# Patient Record
Sex: MALE | Race: WHITE | NOT HISPANIC OR LATINO | Employment: OTHER | URBAN - METROPOLITAN AREA
[De-identification: names, ages, dates, MRNs, and addresses within clinical notes are randomized per-mention and may not be internally consistent; named-entity substitution may affect disease eponyms.]

---

## 2023-08-23 PROBLEM — G43.909 MIGRAINE: Status: ACTIVE | Noted: 2023-08-23

## 2023-08-23 PROBLEM — E11.9 ABNORMAL METABOLIC STATE DUE TO DIABETES MELLITUS (MULTI): Status: ACTIVE | Noted: 2023-08-23

## 2023-08-23 PROBLEM — E78.00 HYPERCHOLESTEROLEMIA: Status: ACTIVE | Noted: 2023-08-23

## 2023-08-23 PROBLEM — G44.209 TENSION HEADACHE: Status: ACTIVE | Noted: 2023-08-23

## 2023-08-23 PROBLEM — I49.3 VENTRICULAR PREMATURE DEPOLARIZATION: Status: ACTIVE | Noted: 2023-08-23

## 2023-08-23 PROBLEM — E11.9 TYPE 2 DIABETES MELLITUS WITHOUT COMPLICATION (MULTI): Status: ACTIVE | Noted: 2023-08-23

## 2023-08-23 PROBLEM — F17.210 CIGARETTE SMOKER: Status: ACTIVE | Noted: 2023-08-23

## 2023-08-23 PROBLEM — M10.9 GOUT: Status: ACTIVE | Noted: 2023-08-23

## 2023-08-23 PROBLEM — J32.9 SINUSITIS: Status: ACTIVE | Noted: 2023-08-23

## 2023-08-23 PROBLEM — I71.40 ABDOMINAL AORTIC ANEURYSM (CMS-HCC): Status: ACTIVE | Noted: 2023-08-23

## 2023-08-23 PROBLEM — D64.9 ANEMIA: Status: ACTIVE | Noted: 2023-08-23

## 2023-08-23 PROBLEM — R00.2 PALPITATIONS: Status: ACTIVE | Noted: 2023-08-23

## 2023-08-23 PROBLEM — H25.10 NUCLEAR SENILE CATARACT: Status: ACTIVE | Noted: 2023-08-23

## 2023-08-23 PROBLEM — K21.9 GASTROESOPHAGEAL REFLUX DISEASE: Status: ACTIVE | Noted: 2023-08-23

## 2023-08-23 PROBLEM — I10 ESSENTIAL HYPERTENSION: Status: ACTIVE | Noted: 2023-08-23

## 2023-08-23 PROBLEM — M16.9 OSTEOARTHRITIS OF HIP: Status: ACTIVE | Noted: 2023-08-23

## 2023-08-23 PROBLEM — R91.8 MULTIPLE NODULES OF LUNG: Status: ACTIVE | Noted: 2023-08-23

## 2023-08-23 PROBLEM — L02.31 ABSCESS, GLUTEAL CLEFT: Status: ACTIVE | Noted: 2023-08-23

## 2023-08-23 PROBLEM — J90 PLEURAL EFFUSION: Status: ACTIVE | Noted: 2023-08-23

## 2023-08-23 PROBLEM — J18.9 PNEUMONIA: Status: ACTIVE | Noted: 2023-08-23

## 2023-08-23 PROBLEM — G44.009 CLUSTER HEADACHE SYNDROME: Status: ACTIVE | Noted: 2023-08-23

## 2023-08-23 PROBLEM — I73.9 PERIPHERAL ARTERIAL DISEASE (CMS-HCC): Status: ACTIVE | Noted: 2023-08-23

## 2023-08-23 PROBLEM — E11.65 HYPERGLYCEMIA DUE TO TYPE 2 DIABETES MELLITUS (MULTI): Status: ACTIVE | Noted: 2023-08-23

## 2023-08-23 PROBLEM — R06.02 SHORTNESS OF BREATH: Status: ACTIVE | Noted: 2023-08-23

## 2023-08-23 PROBLEM — H60.90 OTITIS EXTERNA: Status: ACTIVE | Noted: 2023-08-23

## 2023-08-23 PROBLEM — N18.30 STAGE 3 CHRONIC KIDNEY DISEASE (MULTI): Status: ACTIVE | Noted: 2023-08-23

## 2023-08-23 RX ORDER — ALLOPURINOL 300 MG/1
1 TABLET ORAL DAILY
COMMUNITY
End: 2024-05-02 | Stop reason: WASHOUT

## 2023-08-23 RX ORDER — GABAPENTIN 100 MG/1
2 CAPSULE ORAL NIGHTLY
COMMUNITY
Start: 2018-07-31 | End: 2024-05-02 | Stop reason: ALTCHOICE

## 2023-08-23 RX ORDER — ALLOPURINOL 100 MG/1
1 TABLET ORAL DAILY
COMMUNITY
End: 2024-05-02 | Stop reason: SDUPTHER

## 2023-08-23 RX ORDER — PREDNISONE 20 MG/1
2 TABLET ORAL DAILY
COMMUNITY
Start: 2018-07-27 | End: 2024-05-02 | Stop reason: WASHOUT

## 2023-08-23 RX ORDER — INDOMETHACIN 50 MG/1
1 CAPSULE ORAL
COMMUNITY
Start: 2017-02-23 | End: 2024-05-02 | Stop reason: ALTCHOICE

## 2023-08-23 RX ORDER — IBUPROFEN 800 MG/1
1 TABLET ORAL 3 TIMES DAILY PRN
COMMUNITY
Start: 2018-03-20 | End: 2024-05-02 | Stop reason: ALTCHOICE

## 2023-08-23 RX ORDER — HYDROCHLOROTHIAZIDE 25 MG/1
1 TABLET ORAL DAILY
COMMUNITY
Start: 2019-01-23 | End: 2024-05-02 | Stop reason: SDUPTHER

## 2023-08-23 RX ORDER — SIMVASTATIN 40 MG/1
1 TABLET, FILM COATED ORAL EVERY EVENING
COMMUNITY
End: 2024-05-02 | Stop reason: WASHOUT

## 2023-08-23 RX ORDER — GABAPENTIN 300 MG/1
1 CAPSULE ORAL DAILY
COMMUNITY
End: 2024-05-02 | Stop reason: ALTCHOICE

## 2023-08-23 RX ORDER — SUMATRIPTAN 50 MG/1
1 TABLET, FILM COATED ORAL ONCE AS NEEDED
COMMUNITY
Start: 2017-07-07 | End: 2024-05-02 | Stop reason: WASHOUT

## 2023-08-23 RX ORDER — METOPROLOL SUCCINATE 25 MG/1
1 TABLET, EXTENDED RELEASE ORAL DAILY
COMMUNITY
Start: 2018-09-26 | End: 2024-05-02 | Stop reason: ALTCHOICE

## 2023-08-23 RX ORDER — VERAPAMIL HYDROCHLORIDE 240 MG/1
1 TABLET, FILM COATED, EXTENDED RELEASE ORAL DAILY
COMMUNITY
End: 2024-05-02 | Stop reason: SDUPTHER

## 2023-08-23 RX ORDER — OMEPRAZOLE 20 MG/1
1 CAPSULE, DELAYED RELEASE ORAL
COMMUNITY
End: 2024-05-02 | Stop reason: SDUPTHER

## 2023-08-23 RX ORDER — METFORMIN HYDROCHLORIDE 500 MG/1
2 TABLET, EXTENDED RELEASE ORAL DAILY
COMMUNITY
Start: 2021-01-07 | End: 2024-05-02 | Stop reason: SDUPTHER

## 2023-08-23 RX ORDER — LISINOPRIL 10 MG/1
1 TABLET ORAL DAILY
COMMUNITY
End: 2024-05-02 | Stop reason: WASHOUT

## 2023-08-23 RX ORDER — ALLOPURINOL 200 MG/1
1 TABLET ORAL DAILY
COMMUNITY
End: 2024-05-02 | Stop reason: WASHOUT

## 2023-08-23 RX ORDER — VERAPAMIL HYDROCHLORIDE 180 MG/1
1 TABLET, FILM COATED, EXTENDED RELEASE ORAL DAILY
COMMUNITY
Start: 2018-03-19 | End: 2024-05-02 | Stop reason: WASHOUT

## 2023-08-23 RX ORDER — LOSARTAN POTASSIUM 50 MG/1
1 TABLET ORAL DAILY
COMMUNITY
End: 2024-05-02 | Stop reason: SDUPTHER

## 2023-08-23 RX ORDER — AMOXICILLIN AND CLAVULANATE POTASSIUM 875; 125 MG/1; MG/1
1 TABLET, FILM COATED ORAL 2 TIMES DAILY
COMMUNITY
Start: 2018-03-20 | End: 2024-05-02 | Stop reason: ALTCHOICE

## 2023-08-23 RX ORDER — ATORVASTATIN CALCIUM 40 MG/1
1 TABLET, FILM COATED ORAL DAILY
COMMUNITY
End: 2024-05-02 | Stop reason: SDUPTHER

## 2023-08-23 RX ORDER — SUMATRIPTAN SUCCINATE 100 MG/1
1 TABLET ORAL
COMMUNITY
Start: 2017-08-16 | End: 2024-05-02 | Stop reason: WASHOUT

## 2023-08-23 RX ORDER — CYCLOBENZAPRINE HCL 10 MG
1 TABLET ORAL 3 TIMES DAILY PRN
COMMUNITY
Start: 2018-07-27 | End: 2024-05-02 | Stop reason: WASHOUT

## 2023-09-28 ENCOUNTER — HOSPITAL ENCOUNTER (OUTPATIENT)
Dept: DATA CONVERSION | Facility: HOSPITAL | Age: 68
Discharge: HOME | End: 2023-09-28
Payer: COMMERCIAL

## 2023-09-28 DIAGNOSIS — E11.9 TYPE 2 DIABETES MELLITUS WITHOUT COMPLICATIONS (MULTI): ICD-10-CM

## 2023-09-28 DIAGNOSIS — M10.9 GOUT, UNSPECIFIED: ICD-10-CM

## 2023-09-28 LAB
ALBUMIN SERPL-MCNC: 4.2 GM/DL (ref 3.5–5)
ALBUMIN/GLOB SERPL: 1.4 RATIO (ref 1.5–3)
ALP BLD-CCNC: 86 U/L (ref 35–125)
ALT SERPL-CCNC: 25 U/L (ref 5–40)
ANION GAP SERPL CALCULATED.3IONS-SCNC: 14 MMOL/L (ref 0–19)
APPEARANCE PLAS: ABNORMAL
AST SERPL-CCNC: 23 U/L (ref 5–40)
BILIRUB SERPL-MCNC: 0.5 MG/DL (ref 0.1–1.2)
BUN SERPL-MCNC: 22 MG/DL (ref 8–25)
BUN/CREAT SERPL: 16.9 RATIO (ref 8–21)
CALCIUM SERPL-MCNC: 9.1 MG/DL (ref 8.5–10.4)
CHLORIDE SERPL-SCNC: 106 MMOL/L (ref 97–107)
CHOLEST SERPL-MCNC: 121 MG/DL (ref 133–200)
CHOLEST/HDLC SERPL: 3.1 RATIO
CO2 SERPL-SCNC: 23 MMOL/L (ref 24–31)
COLOR SPUN FLD: ABNORMAL
CREAT SERPL-MCNC: 1.3 MG/DL (ref 0.4–1.6)
DEPRECATED RDW RBC AUTO: 47.6 FL (ref 37–54)
ERYTHROCYTE [DISTWIDTH] IN BLOOD BY AUTOMATED COUNT: 14.4 % (ref 11.7–15)
FASTING STATUS PATIENT QL REPORTED: ABNORMAL
GFR SERPL CREATININE-BSD FRML MDRD: 60 ML/MIN/1.73 M2
GLOBULIN SER-MCNC: 3.1 G/DL (ref 1.9–3.7)
GLUCOSE SERPL-MCNC: 80 MG/DL (ref 65–99)
HBA1C MFR BLD: 5.9 % (ref 4–6)
HCT VFR BLD AUTO: 47.5 % (ref 41–50)
HDLC SERPL-MCNC: 39 MG/DL
HGB BLD-MCNC: 15 GM/DL (ref 13.5–16.5)
LDLC SERPL CALC-MCNC: 50 MG/DL (ref 65–130)
MCH RBC QN AUTO: 28.5 PG (ref 26–34)
MCHC RBC AUTO-ENTMCNC: 31.6 % (ref 31–37)
MCV RBC AUTO: 90.1 FL (ref 80–100)
NRBC BLD-RTO: 0 /100 WBC
PLATELET # BLD AUTO: 119 K/UL (ref 150–450)
PMV BLD AUTO: 13 CU (ref 7–12.6)
POTASSIUM SERPL-SCNC: 3.8 MMOL/L (ref 3.4–5.1)
PROT SERPL-MCNC: 7.3 G/DL (ref 5.9–7.9)
RBC # BLD AUTO: 5.27 M/UL (ref 4.5–5.5)
SODIUM SERPL-SCNC: 143 MMOL/L (ref 133–145)
TRIGL SERPL-MCNC: 162 MG/DL (ref 40–150)
URATE SERPL-MCNC: 5.4 MG/DL (ref 3.6–7.7)
WBC # BLD AUTO: 8.3 K/UL (ref 4.5–11)

## 2023-10-11 PROBLEM — F41.9 ANXIETY: Status: ACTIVE | Noted: 2023-10-11

## 2024-05-02 ENCOUNTER — OFFICE VISIT (OUTPATIENT)
Dept: PRIMARY CARE | Facility: CLINIC | Age: 69
End: 2024-05-02
Payer: MEDICARE

## 2024-05-02 ENCOUNTER — LAB (OUTPATIENT)
Dept: LAB | Facility: LAB | Age: 69
End: 2024-05-02
Payer: MEDICARE

## 2024-05-02 VITALS
DIASTOLIC BLOOD PRESSURE: 73 MMHG | OXYGEN SATURATION: 96 % | WEIGHT: 200 LBS | SYSTOLIC BLOOD PRESSURE: 134 MMHG | HEART RATE: 68 BPM | BODY MASS INDEX: 28 KG/M2 | HEIGHT: 71 IN | TEMPERATURE: 97.7 F

## 2024-05-02 DIAGNOSIS — E78.00 HYPERCHOLESTEROLEMIA: ICD-10-CM

## 2024-05-02 DIAGNOSIS — I73.9 PERIPHERAL ARTERIAL DISEASE (CMS-HCC): ICD-10-CM

## 2024-05-02 DIAGNOSIS — I12.9 BENIGN HYPERTENSIVE KIDNEY DISEASE WITH CHRONIC KIDNEY DISEASE STAGE I THROUGH STAGE IV, OR UNSPECIFIED(403.10): ICD-10-CM

## 2024-05-02 DIAGNOSIS — F41.9 ANXIETY: ICD-10-CM

## 2024-05-02 DIAGNOSIS — Z12.5 SCREENING FOR PROSTATE CANCER: ICD-10-CM

## 2024-05-02 DIAGNOSIS — E11.9 TYPE 2 DIABETES MELLITUS WITHOUT COMPLICATION, WITHOUT LONG-TERM CURRENT USE OF INSULIN (MULTI): ICD-10-CM

## 2024-05-02 DIAGNOSIS — M1A.9XX0 CHRONIC GOUT WITHOUT TOPHUS, UNSPECIFIED CAUSE, UNSPECIFIED SITE: ICD-10-CM

## 2024-05-02 DIAGNOSIS — Z12.11 SCREENING FOR COLON CANCER: ICD-10-CM

## 2024-05-02 DIAGNOSIS — Z12.11 COLON CANCER SCREENING: ICD-10-CM

## 2024-05-02 DIAGNOSIS — E11.9 ABNORMAL METABOLIC STATE DUE TO DIABETES MELLITUS (MULTI): ICD-10-CM

## 2024-05-02 DIAGNOSIS — E11.9 TYPE 2 DIABETES MELLITUS WITHOUT COMPLICATION, WITHOUT LONG-TERM CURRENT USE OF INSULIN (MULTI): Primary | ICD-10-CM

## 2024-05-02 DIAGNOSIS — K21.9 GASTROESOPHAGEAL REFLUX DISEASE WITHOUT ESOPHAGITIS: ICD-10-CM

## 2024-05-02 DIAGNOSIS — N18.31 STAGE 3A CHRONIC KIDNEY DISEASE (MULTI): ICD-10-CM

## 2024-05-02 PROBLEM — I71.40 ABDOMINAL AORTIC ANEURYSM (CMS-HCC): Status: RESOLVED | Noted: 2023-08-23 | Resolved: 2024-05-02

## 2024-05-02 LAB
ALBUMIN SERPL-MCNC: 4.3 G/DL (ref 3.5–5)
ALP BLD-CCNC: 80 U/L (ref 35–125)
ALT SERPL-CCNC: <5 U/L (ref 5–40)
ANION GAP SERPL CALC-SCNC: 13 MMOL/L
AST SERPL-CCNC: 14 U/L (ref 5–40)
BASOPHILS # BLD AUTO: 0.07 X10*3/UL (ref 0–0.1)
BASOPHILS NFR BLD AUTO: 0.9 %
BILIRUB SERPL-MCNC: 0.3 MG/DL (ref 0.1–1.2)
BUN SERPL-MCNC: 27 MG/DL (ref 8–25)
CALCIUM SERPL-MCNC: 8.9 MG/DL (ref 8.5–10.4)
CHLORIDE SERPL-SCNC: 103 MMOL/L (ref 97–107)
CHOLEST SERPL-MCNC: 193 MG/DL (ref 133–200)
CHOLEST/HDLC SERPL: 6.2 {RATIO}
CO2 SERPL-SCNC: 25 MMOL/L (ref 24–31)
CREAT SERPL-MCNC: 1.3 MG/DL (ref 0.4–1.6)
CREAT UR-MCNC: 132.9 MG/DL
EGFRCR SERPLBLD CKD-EPI 2021: 60 ML/MIN/1.73M*2
EOSINOPHIL # BLD AUTO: 0.33 X10*3/UL (ref 0–0.7)
EOSINOPHIL NFR BLD AUTO: 4.3 %
ERYTHROCYTE [DISTWIDTH] IN BLOOD BY AUTOMATED COUNT: 14.3 % (ref 11.5–14.5)
GLUCOSE SERPL-MCNC: 143 MG/DL (ref 65–99)
HCT VFR BLD AUTO: 45.6 % (ref 41–52)
HDLC SERPL-MCNC: 31 MG/DL
HGB BLD-MCNC: 14.4 G/DL (ref 13.5–17.5)
IMM GRANULOCYTES # BLD AUTO: 0.03 X10*3/UL (ref 0–0.7)
IMM GRANULOCYTES NFR BLD AUTO: 0.4 % (ref 0–0.9)
LDLC SERPL CALC-MCNC: ABNORMAL MG/DL
LYMPHOCYTES # BLD AUTO: 1.56 X10*3/UL (ref 1.2–4.8)
LYMPHOCYTES NFR BLD AUTO: 20.2 %
MCH RBC QN AUTO: 28.7 PG (ref 26–34)
MCHC RBC AUTO-ENTMCNC: 31.6 G/DL (ref 32–36)
MCV RBC AUTO: 91 FL (ref 80–100)
MICROALBUMIN UR-MCNC: 18 MG/L (ref 0–23)
MICROALBUMIN/CREAT UR: 13.5 UG/MG CREAT
MONOCYTES # BLD AUTO: 0.6 X10*3/UL (ref 0.1–1)
MONOCYTES NFR BLD AUTO: 7.8 %
NEUTROPHILS # BLD AUTO: 5.14 X10*3/UL (ref 1.2–7.7)
NEUTROPHILS NFR BLD AUTO: 66.4 %
NRBC BLD-RTO: 0 /100 WBCS (ref 0–0)
PLATELET # BLD AUTO: 126 X10*3/UL (ref 150–450)
POC HEMOGLOBIN A1C: 6.3 % (ref 4.2–6.5)
POTASSIUM SERPL-SCNC: 4.2 MMOL/L (ref 3.4–5.1)
PROT SERPL-MCNC: 6.9 G/DL (ref 5.9–7.9)
PSA SERPL-MCNC: 0.7 NG/ML
RBC # BLD AUTO: 5.02 X10*6/UL (ref 4.5–5.9)
SODIUM SERPL-SCNC: 141 MMOL/L (ref 133–145)
TRIGL SERPL-MCNC: 529 MG/DL (ref 40–150)
VIT B12 SERPL-MCNC: 534 PG/ML (ref 211–946)
WBC # BLD AUTO: 7.7 X10*3/UL (ref 4.4–11.3)

## 2024-05-02 PROCEDURE — G2211 COMPLEX E/M VISIT ADD ON: HCPCS | Performed by: INTERNAL MEDICINE

## 2024-05-02 PROCEDURE — 99214 OFFICE O/P EST MOD 30 MIN: CPT | Performed by: INTERNAL MEDICINE

## 2024-05-02 PROCEDURE — 1157F ADVNC CARE PLAN IN RCRD: CPT | Performed by: INTERNAL MEDICINE

## 2024-05-02 PROCEDURE — 3078F DIAST BP <80 MM HG: CPT | Performed by: INTERNAL MEDICINE

## 2024-05-02 PROCEDURE — 1126F AMNT PAIN NOTED NONE PRSNT: CPT | Performed by: INTERNAL MEDICINE

## 2024-05-02 PROCEDURE — 80053 COMPREHEN METABOLIC PANEL: CPT

## 2024-05-02 PROCEDURE — 82607 VITAMIN B-12: CPT

## 2024-05-02 PROCEDURE — 83036 HEMOGLOBIN GLYCOSYLATED A1C: CPT | Performed by: INTERNAL MEDICINE

## 2024-05-02 PROCEDURE — 82043 UR ALBUMIN QUANTITATIVE: CPT

## 2024-05-02 PROCEDURE — 1159F MED LIST DOCD IN RCRD: CPT | Performed by: INTERNAL MEDICINE

## 2024-05-02 PROCEDURE — G0103 PSA SCREENING: HCPCS

## 2024-05-02 PROCEDURE — 80061 LIPID PANEL: CPT

## 2024-05-02 PROCEDURE — 85025 COMPLETE CBC W/AUTO DIFF WBC: CPT

## 2024-05-02 PROCEDURE — 82570 ASSAY OF URINE CREATININE: CPT

## 2024-05-02 PROCEDURE — 36415 COLL VENOUS BLD VENIPUNCTURE: CPT

## 2024-05-02 PROCEDURE — 4010F ACE/ARB THERAPY RXD/TAKEN: CPT | Performed by: INTERNAL MEDICINE

## 2024-05-02 PROCEDURE — 3075F SYST BP GE 130 - 139MM HG: CPT | Performed by: INTERNAL MEDICINE

## 2024-05-02 RX ORDER — ATORVASTATIN CALCIUM 40 MG/1
40 TABLET, FILM COATED ORAL DAILY
Qty: 90 TABLET | Refills: 2 | Status: SHIPPED | OUTPATIENT
Start: 2024-05-02

## 2024-05-02 RX ORDER — OMEPRAZOLE 20 MG/1
20 CAPSULE, DELAYED RELEASE ORAL
Qty: 90 CAPSULE | Refills: 2 | Status: SHIPPED | OUTPATIENT
Start: 2024-05-02

## 2024-05-02 RX ORDER — HYDROCHLOROTHIAZIDE 25 MG/1
25 TABLET ORAL DAILY
Qty: 90 TABLET | Refills: 2 | Status: SHIPPED | OUTPATIENT
Start: 2024-05-02

## 2024-05-02 RX ORDER — ALLOPURINOL 100 MG/1
100 TABLET ORAL DAILY
Qty: 90 TABLET | Refills: 2 | Status: SHIPPED | OUTPATIENT
Start: 2024-05-02

## 2024-05-02 RX ORDER — VERAPAMIL HYDROCHLORIDE 240 MG/1
240 TABLET, FILM COATED, EXTENDED RELEASE ORAL DAILY
Qty: 90 TABLET | Refills: 2 | Status: SHIPPED | OUTPATIENT
Start: 2024-05-02

## 2024-05-02 RX ORDER — METFORMIN HYDROCHLORIDE 500 MG/1
1000 TABLET, EXTENDED RELEASE ORAL DAILY
Qty: 180 TABLET | Refills: 2 | Status: SHIPPED | OUTPATIENT
Start: 2024-05-02

## 2024-05-02 RX ORDER — LOSARTAN POTASSIUM 50 MG/1
50 TABLET ORAL DAILY
Qty: 90 TABLET | Refills: 2 | Status: SHIPPED | OUTPATIENT
Start: 2024-05-02

## 2024-05-02 ASSESSMENT — ENCOUNTER SYMPTOMS
PALPITATIONS: 0
SHORTNESS OF BREATH: 0

## 2024-05-02 ASSESSMENT — PAIN SCALES - GENERAL: PAINLEVEL: 0-NO PAIN

## 2024-05-02 ASSESSMENT — PATIENT HEALTH QUESTIONNAIRE - PHQ9
1. LITTLE INTEREST OR PLEASURE IN DOING THINGS: NOT AT ALL
2. FEELING DOWN, DEPRESSED OR HOPELESS: NOT AT ALL
SUM OF ALL RESPONSES TO PHQ9 QUESTIONS 1 AND 2: 0

## 2024-05-02 NOTE — PATIENT INSTRUCTIONS
follow up October.    Diagnoses and all orders for this visit:  Abnormal metabolic state due to diabetes mellitus (Multi)  Hypercholesterolemia  -     Lipid Panel; Future  -     atorvastatin (Lipitor) 40 mg tablet; Take 1 tablet (40 mg) by mouth once daily.  Peripheral arterial disease (CMS-HCC)  Type 2 diabetes mellitus without complication, without long-term current use of insulin (Multi)  Comments:  HGA1C 6.3%, eye exam yearly.  Orders:  -     CBC and Auto Differential; Future  -     Comprehensive Metabolic Panel; Future  -     Vitamin B12; Future  -     metFORMIN  mg 24 hr tablet; Take 2 tablets (1,000 mg) by mouth once daily. With a meal as directed  -     empagliflozin (Jardiance) 25 mg; Take 1 tablet (25 mg) by mouth once daily in the morning.  -     Albumin , Urine Random; Future  Anxiety  Chronic gout without tophus, unspecified cause, unspecified site  -     allopurinol (Zyloprim) 100 mg tablet; Take 1 tablet (100 mg) by mouth once daily.  Screening for prostate cancer  -     PSA; Future  Colon cancer screening  Comments:  FIT home colon cancer screen.  Gastroesophageal reflux disease without esophagitis  -     omeprazole (PriLOSEC) 20 mg DR capsule; Take 1 capsule (20 mg) by mouth once daily in the morning. Take before meals. 30 mins before meal  Stage 3a chronic kidney disease (Multi)  Benign hypertensive kidney disease with chronic kidney disease stage I through stage IV, or unspecified(403.10)  Comments:  BP doing OK.  Orders:  -     verapamil SR (Calan-SR) 240 mg ER tablet; Take 1 tablet (240 mg) by mouth once daily.  -     metFORMIN  mg 24 hr tablet; Take 2 tablets (1,000 mg) by mouth once daily. With a meal as directed  -     losartan (Cozaar) 50 mg tablet; Take 1 tablet (50 mg) by mouth once daily.  -     hydroCHLOROthiazide (HYDRODiuril) 25 mg tablet; Take 1 tablet (25 mg) by mouth once daily.  Screening for colon cancer  -     Fecal Occult Blood Immunoassy; Future

## 2024-05-02 NOTE — PROGRESS NOTES
[facility]: [facility]  PROGRESS NOTE      Darrion Pruett is a 68 y.o. male that is presenting today for Follow-up (Follow up).    Assessment/Plan   Diagnoses and all orders for this visit:  Abnormal metabolic state due to diabetes mellitus (Multi)  Hypercholesterolemia  -     Lipid Panel; Future  -     atorvastatin (Lipitor) 40 mg tablet; Take 1 tablet (40 mg) by mouth once daily.  Peripheral arterial disease (CMS-HCC)  Type 2 diabetes mellitus without complication, without long-term current use of insulin (Multi)  Comments:  HGA1C 6.3%, eye exam yearly.  Orders:  -     CBC and Auto Differential; Future  -     Comprehensive Metabolic Panel; Future  -     Vitamin B12; Future  -     metFORMIN  mg 24 hr tablet; Take 2 tablets (1,000 mg) by mouth once daily. With a meal as directed  -     empagliflozin (Jardiance) 25 mg; Take 1 tablet (25 mg) by mouth once daily in the morning.  -     Albumin , Urine Random; Future  Anxiety  Chronic gout without tophus, unspecified cause, unspecified site  -     allopurinol (Zyloprim) 100 mg tablet; Take 1 tablet (100 mg) by mouth once daily.  Screening for prostate cancer  -     PSA; Future  Colon cancer screening  Comments:  FIT home colon cancer screen.  Gastroesophageal reflux disease without esophagitis  -     omeprazole (PriLOSEC) 20 mg DR capsule; Take 1 capsule (20 mg) by mouth once daily in the morning. Take before meals. 30 mins before meal  Stage 3a chronic kidney disease (Multi)  Benign hypertensive kidney disease with chronic kidney disease stage I through stage IV, or unspecified(403.10)  Comments:  BP doing OK.  Orders:  -     verapamil SR (Calan-SR) 240 mg ER tablet; Take 1 tablet (240 mg) by mouth once daily.  -     metFORMIN  mg 24 hr tablet; Take 2 tablets (1,000 mg) by mouth once daily. With a meal as directed  -     losartan (Cozaar) 50 mg tablet; Take 1 tablet (50 mg) by mouth once daily.  -     hydroCHLOROthiazide (HYDRODiuril)  25 mg tablet; Take 1 tablet (25 mg) by mouth once daily.  Screening for colon cancer  -     Fecal Occult Blood Immunoassy; Future    Subjective   Follow up DM, HTN, Chol, S/P AAA. Doing OK,  Tobacco dependence, declines CT chest aware inc. M/M risk.    Review of Systems   Respiratory:  Negative for shortness of breath.    Cardiovascular:  Negative for chest pain and palpitations.   All other systems reviewed and are negative.     Objective   Vitals:    05/02/24 1511   BP: 134/73   Pulse: 68   Temp: 36.5 °C (97.7 °F)   SpO2: 96%      Body mass index is 27.89 kg/m².  Physical Exam  Constitutional:       General: He is not in acute distress.  HENT:      Head: Normocephalic and atraumatic.      Right Ear: Tympanic membrane normal.      Left Ear: Tympanic membrane normal.      Mouth/Throat:      Mouth: Mucous membranes are moist.      Pharynx: Oropharynx is clear.   Eyes:      Extraocular Movements: Extraocular movements intact.      Conjunctiva/sclera: Conjunctivae normal.      Pupils: Pupils are equal, round, and reactive to light.   Cardiovascular:      Rate and Rhythm: Normal rate and regular rhythm.   Pulmonary:      Breath sounds: Normal breath sounds.   Abdominal:      General: Bowel sounds are normal.      Palpations: Abdomen is soft. There is no mass.   Musculoskeletal:         General: Normal range of motion.      Cervical back: Neck supple. No tenderness.   Skin:     General: Skin is warm and dry.   Neurological:      General: No focal deficit present.      Mental Status: He is oriented to person, place, and time.      Sensory: No sensory deficit.     Diagnostic Results   Lab Results   Component Value Date    GLUCOSE 80 09/28/2023    CALCIUM 9.1 09/28/2023     09/28/2023    K 3.8 09/28/2023    CO2 23 (L) 09/28/2023     09/28/2023    BUN 22 09/28/2023    CREATININE 1.3 09/28/2023     Lab Results   Component Value Date    ALT 25 09/28/2023    AST 23 09/28/2023    ALKPHOS 86 09/28/2023    BILITOT 0.5  "09/28/2023     Lab Results   Component Value Date    WBC 8.3 09/28/2023    HGB 15.0 09/28/2023    HCT 47.5 09/28/2023    MCV 90.1 09/28/2023     (L) 09/28/2023     Lab Results   Component Value Date    CHOL 121 (L) 09/28/2023    CHOL 125 (L) 08/20/2022    CHOL 129 (L) 03/05/2022     Lab Results   Component Value Date    HDL 39 (L) 09/28/2023    HDL 35 (L) 08/20/2022    HDL 36 (L) 03/05/2022     Lab Results   Component Value Date    LDLCALC 50 (L) 09/28/2023    LDLCALC 63 (L) 08/20/2022    LDLCALC 70 03/05/2022     Lab Results   Component Value Date    TRIG 162 (H) 09/28/2023    TRIG 136 08/20/2022    TRIG 113 03/05/2022     No components found for: \"CHOLHDL\"  Lab Results   Component Value Date    HGBA1C 5.9 09/28/2023     Other labs not included in the list above were reviewed either before or during this encounter.    History    Past Medical History:   Diagnosis Date   • Abdominal aortic aneurysm (CMS-[facility]) 08/23/2023    AAA reapir S/P aorto fem bypass  10/18   • Cigarette smoker 08/23/2023    Declines screenig >4 5/24 aware inc M/M   • Colon cancer screening 05/02/2024    Declines colonoscopy, Cologuard, FIT, >4 x aware inc M/M.     Past Surgical History:   Procedure Laterality Date   • CT AORTA AND BILATERAL ILIOFEMORAL RUNOFF ANGIOGRAM W AND/OR WO IV CONTRAST  9/10/2018    CT AORTA AND BILATERAL ILIOFEMORAL RUNOFF ANGIOGRAM W AND/OR WO IV CONTRAST [facility]   • OTHER SURGICAL HISTORY  11/28/2018    Iliofemoral bypass   • OTHER SURGICAL HISTORY  11/28/2018    Aortic aneurysm repair     Family History   Problem Relation Name Age of Onset   • Hypertension Mother     • Other (cardiac arrest) Father     • Cancer Other maternal uncle      Social History     Socioeconomic History   • Marital status:      Spouse name: Not on file   • Number of children: Not on file   • Years of education: Not on file   • Highest education level: Not on file   Occupational History   • Not on file "   Tobacco Use   • Smoking status: Every Day     Current packs/day: 1.50     Types: Cigarettes     Passive exposure: Current   • Smokeless tobacco: Current   Vaping Use   • Vaping status: Never Used   Substance and Sexual Activity   • Alcohol use: Yes   • Drug use: Yes     Types: Marijuana   • Sexual activity: Not on file   Other Topics Concern   • Not on file   Social History Narrative   • Not on file     Social Determinants of Health     Financial Resource Strain: Not on file   Food Insecurity: Not on file   Transportation Needs: Not on file   Physical Activity: Not on file   Stress: Not on file   Social Connections: Not on file   Intimate Partner Violence: Not on file   Housing Stability: Not on file     No Known Allergies  Current Outpatient Medications on File Prior to Visit   Medication Sig Dispense Refill   • [DISCONTINUED] allopurinol (Zyloprim) 100 mg tablet Take 1 tablet (100 mg) by mouth once daily.     • [DISCONTINUED] atorvastatin (Lipitor) 40 mg tablet Take 1 tablet (40 mg) by mouth once daily.     • [DISCONTINUED] empagliflozin (Jardiance) 25 mg Take 1 tablet (25 mg) by mouth once daily in the morning.     • [DISCONTINUED] gabapentin (Neurontin) 300 mg capsule Take 1 capsule (300 mg) by mouth once daily.     • [DISCONTINUED] hydroCHLOROthiazide (HYDRODiuril) 25 mg tablet Take 1 tablet (25 mg) by mouth once daily.     • [DISCONTINUED] ibuprofen 800 mg tablet Take 1 tablet (800 mg) by mouth 3 times a day as needed. With food or milk     • [DISCONTINUED] indomethacin (Indocin) 50 mg capsule Take 1 capsule (50 mg) by mouth 2 times a day with meals.     • [DISCONTINUED] losartan (Cozaar) 50 mg tablet Take 1 tablet (50 mg) by mouth once daily.     • [DISCONTINUED] metFORMIN XR (Glucophage-XR) 500 mg 24 hr tablet Take 2 tablets (1,000 mg) by mouth once daily. With a meal as directed     • [DISCONTINUED] omeprazole (PriLOSEC) 20 mg DR capsule 1 capsule (20 mg) once daily in the morning. Take before meals. 30  mins before meal     • [DISCONTINUED] verapamil SR (Calan-SR) 240 mg ER tablet Take 1 tablet (240 mg) by mouth once daily.     • acetaminophen/diphenhydramine (TYLENOL COLD RELIEF ORAL) Take by mouth.     • [DISCONTINUED] allopurinol (Zyloprim) 300 mg tablet Take 1 tablet (300 mg) by mouth once daily.     • [DISCONTINUED] allopurinoL 200 mg tablet Take 1 tablet by mouth once daily.     • [DISCONTINUED] amoxicillin-pot clavulanate (Augmentin) 875-125 mg tablet Take 1 tablet (875 mg) by mouth 2 times a day.     • [DISCONTINUED] aspirin (ASPIR-81 ORAL) Take 1 tablet by mouth once daily.     • [DISCONTINUED] cyclobenzaprine (Flexeril) 10 mg tablet Take 1 tablet (10 mg) by mouth 3 times a day as needed. For pain, may cause kandice     • [DISCONTINUED] escitalopram (Lexapro) 5 mg tablet 1 tablet Orally at bedtime for 30 days     • [DISCONTINUED] gabapentin (Neurontin) 100 mg capsule Take 2 capsules (200 mg) by mouth once daily at bedtime.     • [DISCONTINUED] lisinopril 10 mg tablet Take 1 tablet (10 mg) by mouth once daily.     • [DISCONTINUED] metoprolol succinate XL (Toprol-XL) 25 mg 24 hr tablet Take 1 tablet (25 mg) by mouth once daily.     • [DISCONTINUED] predniSONE (Deltasone) 20 mg tablet Take 2 tablets (40 mg) by mouth once daily.     • [DISCONTINUED] simvastatin (Zocor) 40 mg tablet Take 1 tablet (40 mg) by mouth once daily in the evening.     • [DISCONTINUED] SUMAtriptan (Imitrex) 100 mg tablet Take 1 tablet (100 mg) by mouth. AT ONSET OF MIGRAINE HEADACHE. MAY REPEAT IN 2 HOURS IF NEEDED     • [DISCONTINUED] SUMAtriptan (Imitrex) 50 mg tablet Take 1 tablet (50 mg) by mouth 1 time if needed. May repeat in 2 hours in needed     • [DISCONTINUED] verapamil SR (Calan-SR) 180 mg ER tablet Take 1 tablet (180 mg) by mouth once daily.       No current facility-administered medications on file prior to visit.     Immunization History   Administered Date(s) Administered   • Influenza, injectable, quadrivalent 11/02/2016,  11/08/2017, 12/04/2018, 10/03/2019   • Pneumococcal conjugate vaccine, 13-valent (PREVNAR 13) 11/02/2016   • Pneumococcal polysaccharide vaccine, 23-valent, age 2 years and older (PNEUMOVAX 23) 03/08/2022   • Td vaccine, age 7 years and older (TDVAX) 01/01/2001   • Tdap vaccine, age 7 year and older (BOOSTRIX, ADACEL) 06/07/2021     Patient's medical history was reviewed and updated either before or during this encounter.       Joao Jones MD

## 2024-05-03 ENCOUNTER — TELEPHONE (OUTPATIENT)
Dept: PRIMARY CARE | Facility: CLINIC | Age: 69
End: 2024-05-03
Payer: MEDICARE

## 2024-05-03 NOTE — TELEPHONE ENCOUNTER
Triglyceride TG level increased likely from not fasting, next lab draw fast 12 hr then recheck lipids, if TG over 500 then will need medication to drive down level to prevent pancreatitis, avoid alcohol as w

## 2024-11-11 ENCOUNTER — APPOINTMENT (OUTPATIENT)
Dept: PRIMARY CARE | Facility: CLINIC | Age: 69
End: 2024-11-11
Payer: MEDICARE

## 2024-11-27 PROBLEM — J98.8 OTHER SPECIFIED RESPIRATORY DISORDERS: Status: ACTIVE | Noted: 2018-10-31

## 2024-11-27 PROBLEM — R05.9 COUGH, UNSPECIFIED: Status: ACTIVE | Noted: 2022-05-31

## 2024-11-27 PROBLEM — M10.49 OTHER SECONDARY GOUT, MULTIPLE SITES: Status: ACTIVE | Noted: 2018-10-31

## 2024-11-27 PROBLEM — K40.90 RIGHT INGUINAL HERNIA: Status: ACTIVE | Noted: 2023-05-04

## 2024-11-27 PROBLEM — E87.5 HYPERKALEMIA: Status: ACTIVE | Noted: 2024-11-27

## 2024-11-27 PROBLEM — N17.9 ACUTE RENAL FAILURE (CMS-HCC): Status: ACTIVE | Noted: 2024-11-27

## 2024-11-27 PROBLEM — I71.40 ABDOMINAL AORTIC ANEURYSM WITHOUT RUPTURE (CMS-HCC): Status: ACTIVE | Noted: 2018-10-31

## 2024-11-27 PROBLEM — R09.89 PULSE IRREGULARITY: Status: ACTIVE | Noted: 2024-11-27

## 2024-11-27 PROBLEM — E78.49 OTHER HYPERLIPIDEMIA: Status: ACTIVE | Noted: 2018-10-31

## 2024-11-27 PROBLEM — G47.30 SLEEP APNEA: Status: ACTIVE | Noted: 2024-11-27

## 2025-01-07 ENCOUNTER — OFFICE VISIT (OUTPATIENT)
Dept: PRIMARY CARE | Facility: CLINIC | Age: 70
End: 2025-01-07
Payer: MEDICARE

## 2025-01-07 VITALS
WEIGHT: 192 LBS | HEIGHT: 71 IN | OXYGEN SATURATION: 95 % | DIASTOLIC BLOOD PRESSURE: 80 MMHG | BODY MASS INDEX: 26.88 KG/M2 | HEART RATE: 77 BPM | SYSTOLIC BLOOD PRESSURE: 159 MMHG | TEMPERATURE: 97.8 F

## 2025-01-07 DIAGNOSIS — E55.9 VITAMIN D DEFICIENCY: ICD-10-CM

## 2025-01-07 DIAGNOSIS — M1A.9XX0 CHRONIC GOUT WITHOUT TOPHUS, UNSPECIFIED CAUSE, UNSPECIFIED SITE: ICD-10-CM

## 2025-01-07 DIAGNOSIS — Z86.79 S/P AAA REPAIR: ICD-10-CM

## 2025-01-07 DIAGNOSIS — Z72.0 TOBACCO ABUSE: ICD-10-CM

## 2025-01-07 DIAGNOSIS — K21.9 GASTROESOPHAGEAL REFLUX DISEASE WITHOUT ESOPHAGITIS: ICD-10-CM

## 2025-01-07 DIAGNOSIS — Z98.890 S/P AAA REPAIR: ICD-10-CM

## 2025-01-07 DIAGNOSIS — E78.2 MIXED HYPERLIPIDEMIA: ICD-10-CM

## 2025-01-07 DIAGNOSIS — E11.9 TYPE 2 DIABETES MELLITUS WITHOUT COMPLICATION, WITHOUT LONG-TERM CURRENT USE OF INSULIN (MULTI): ICD-10-CM

## 2025-01-07 DIAGNOSIS — Z76.89 ENCOUNTER TO ESTABLISH CARE WITH NEW DOCTOR: Primary | ICD-10-CM

## 2025-01-07 DIAGNOSIS — F17.218 CIGARETTE NICOTINE DEPENDENCE WITH OTHER NICOTINE-INDUCED DISORDER: ICD-10-CM

## 2025-01-07 DIAGNOSIS — I12.9 BENIGN HYPERTENSIVE KIDNEY DISEASE WITH CHRONIC KIDNEY DISEASE STAGE I THROUGH STAGE IV, OR UNSPECIFIED(403.10): ICD-10-CM

## 2025-01-07 DIAGNOSIS — Z12.5 ENCOUNTER FOR PROSTATE CANCER SCREENING: ICD-10-CM

## 2025-01-07 LAB — POC HEMOGLOBIN A1C: 6.5 % (ref 4.2–6.5)

## 2025-01-07 PROCEDURE — 3077F SYST BP >= 140 MM HG: CPT | Performed by: INTERNAL MEDICINE

## 2025-01-07 PROCEDURE — 3079F DIAST BP 80-89 MM HG: CPT | Performed by: INTERNAL MEDICINE

## 2025-01-07 PROCEDURE — 3008F BODY MASS INDEX DOCD: CPT | Performed by: INTERNAL MEDICINE

## 2025-01-07 PROCEDURE — 83036 HEMOGLOBIN GLYCOSYLATED A1C: CPT | Mod: QW | Performed by: INTERNAL MEDICINE

## 2025-01-07 PROCEDURE — 1157F ADVNC CARE PLAN IN RCRD: CPT | Performed by: INTERNAL MEDICINE

## 2025-01-07 PROCEDURE — 4010F ACE/ARB THERAPY RXD/TAKEN: CPT | Performed by: INTERNAL MEDICINE

## 2025-01-07 PROCEDURE — 1125F AMNT PAIN NOTED PAIN PRSNT: CPT | Performed by: INTERNAL MEDICINE

## 2025-01-07 PROCEDURE — 99214 OFFICE O/P EST MOD 30 MIN: CPT | Performed by: INTERNAL MEDICINE

## 2025-01-07 PROCEDURE — G2211 COMPLEX E/M VISIT ADD ON: HCPCS | Performed by: INTERNAL MEDICINE

## 2025-01-07 PROCEDURE — 1159F MED LIST DOCD IN RCRD: CPT | Performed by: INTERNAL MEDICINE

## 2025-01-07 RX ORDER — ATORVASTATIN CALCIUM 40 MG/1
40 TABLET, FILM COATED ORAL DAILY
Qty: 90 TABLET | Refills: 2 | Status: SHIPPED | OUTPATIENT
Start: 2025-01-07

## 2025-01-07 RX ORDER — OMEPRAZOLE 20 MG/1
20 CAPSULE, DELAYED RELEASE ORAL
Qty: 90 CAPSULE | Refills: 2 | Status: SHIPPED | OUTPATIENT
Start: 2025-01-07

## 2025-01-07 RX ORDER — VERAPAMIL HCL 240 MG
240 TABLET, EXTENDED RELEASE ORAL DAILY
Qty: 90 TABLET | Refills: 2 | Status: SHIPPED | OUTPATIENT
Start: 2025-01-07

## 2025-01-07 RX ORDER — ALLOPURINOL 100 MG/1
100 TABLET ORAL DAILY
Qty: 90 TABLET | Refills: 2 | Status: SHIPPED | OUTPATIENT
Start: 2025-01-07

## 2025-01-07 RX ORDER — HYDROCHLOROTHIAZIDE 25 MG/1
25 TABLET ORAL DAILY
Qty: 90 TABLET | Refills: 2 | Status: SHIPPED | OUTPATIENT
Start: 2025-01-07

## 2025-01-07 RX ORDER — METFORMIN HYDROCHLORIDE 500 MG/1
1000 TABLET, EXTENDED RELEASE ORAL DAILY
Qty: 180 TABLET | Refills: 2 | Status: SHIPPED | OUTPATIENT
Start: 2025-01-07

## 2025-01-07 RX ORDER — LOSARTAN POTASSIUM 50 MG/1
50 TABLET ORAL DAILY
Qty: 90 TABLET | Refills: 2 | Status: SHIPPED | OUTPATIENT
Start: 2025-01-07

## 2025-01-07 ASSESSMENT — ENCOUNTER SYMPTOMS
DEPRESSION: 0
LOSS OF SENSATION IN FEET: 0
OCCASIONAL FEELINGS OF UNSTEADINESS: 0

## 2025-01-07 ASSESSMENT — PATIENT HEALTH QUESTIONNAIRE - PHQ9
2. FEELING DOWN, DEPRESSED OR HOPELESS: NOT AT ALL
1. LITTLE INTEREST OR PLEASURE IN DOING THINGS: NOT AT ALL
SUM OF ALL RESPONSES TO PHQ9 QUESTIONS 1 AND 2: 0

## 2025-01-07 ASSESSMENT — PAIN SCALES - GENERAL: PAINLEVEL_OUTOF10: 1

## 2025-01-07 NOTE — PROGRESS NOTES
[facility]: [facility]  PROGRESS NOTE      Darrion Pruett is a 69 y.o. male that is presenting today for Establish Care (6 mo fu DM, EIS pt).    Assessment/Plan   Diagnoses and all orders for this visit:  Encounter to establish care with new doctor      - Reviewed patient's available records, discussed PMH, Current active problems Meds and allergies.  Type 2 diabetes mellitus without complication, without long-term current use of insulin (Multi)      -     POCT glycosylated hemoglobin (Hb A1C) manually resulted  6.5  -     metFORMIN  mg 24 hr tablet; Take 2 tablets (1,000 mg) by mouth once daily. With a meal as directed  -     empagliflozin (Jardiance) 25 mg; Take 1 tablet (25 mg) by mouth once daily in the morning.  -     Hemoglobin A1C; Future  -     TSH with reflex to Free T4 if abnormal; Future  -     Comprehensive Metabolic Panel; Future  -     CBC and Auto Differential; Future  -     Albumin-Creatinine Ratio, Urine Random; Future  Mixed hyperlipidemia     Per most recent BW TG not controlled   Continue current medication   Rx Escripted 90 days x 2  -     atorvastatin (Lipitor) 40 mg tablet; Take 1 tablet (40 mg) by mouth once daily.  -     Comprehensive Metabolic Panel; Future  -     Lipid Panel; Future  Benign hypertensive kidney disease with chronic kidney disease stage I through stage IV, or unspecified(403.10)      Not well controlled with current treatment   Continue the same   Rx E-scripted 90 days x 2  -     verapamil SR (Calan-SR) 240 mg ER tablet; Take 1 tablet (240 mg) by mouth once daily.  -     metFORMIN  mg 24 hr tablet; Take 2 tablets (1,000 mg) by mouth once daily. With a meal as directed  -     hydroCHLOROthiazide (HYDRODiuril) 25 mg tablet; Take 1 tablet (25 mg) by mouth once daily.  -     losartan (Cozaar) 50 mg tablet; Take 1 tablet (50 mg) by mouth once daily.  -     Comprehensive Metabolic Panel; Future  Gastroesophageal reflux disease without  esophagitis     Under control with current treatment   Continue the same   Rx E-scripted 90 days x 2  -     omeprazole (PriLOSEC) 20 mg DR capsule; Take 1 capsule (20 mg) by mouth once daily in the morning. Take before meals. 30 mins before meal  Chronic gout without tophus, unspecified cause, unspecified site      Under control with current treatment   Continue the same   Rx E-scripted 90 days x 2  -     allopurinol (Zyloprim) 100 mg tablet; Take 1 tablet (100 mg) by mouth once daily.  S/P AAA repair  -     Vascular US abdominal aorta anuerysm AAA screening; Future  Vitamin D deficiency  -     Vitamin D 25-Hydroxy,Total (for eval of Vitamin D levels); Future  Encounter for prostate cancer screening  -     Prostate Specific Antigen; Future  Cigarette nicotine dependence with other nicotine-induced disorder  Tobacco abuse  -     CT lung screening follow up CT chest wo IV contrast; Future  Other orders  -     Follow Up In Primary Care; Future    Subjective   HPI    - Darrion Pruett 69 y.o. male is here today to establish care (TE), need refills       - Patient denies any symptoms or concerns at this time.       - patient denies any adverse reactions to or concerns with his/her meds.       - Problem list and medication reconciliation done today.  - V.S. Stable. No changes at this time.  - Encouraged continued diet and exercise modification.     Review of Systems   All pertinent POSITIVES as noted per HPI.  All other systems have been reviewed and are NEGATIVE and /or Noncontributory to this patient current visit or complaint.     Objective   Vitals:    01/07/25 0800   BP: 159/80   Pulse: 77   Temp: 36.6 °C (97.8 °F)   SpO2: 95%      Body mass index is 26.78 kg/m².  Physical Exam  Vitals and nursing note reviewed.   Constitutional:       Appearance: Normal appearance.   HENT:      Head: Normocephalic and atraumatic.   Neck:      Vascular: No carotid bruit.   Cardiovascular:      Rate and Rhythm: Normal rate and regular  "rhythm.      Pulses: Normal pulses.      Heart sounds: Normal heart sounds.   Pulmonary:      Effort: Pulmonary effort is normal.      Breath sounds: Normal breath sounds.   Abdominal:      General: Abdomen is flat. Bowel sounds are normal.      Palpations: Abdomen is soft.   Musculoskeletal:         General: No swelling. Normal range of motion.      Cervical back: Neck supple.   Lymphadenopathy:      Cervical: No cervical adenopathy.   Skin:     General: Skin is warm and dry.   Neurological:      Mental Status: He is alert.   Psychiatric:         Mood and Affect: Mood normal.       Diagnostic Results   Lab Results   Component Value Date    GLUCOSE 143 (H) 05/02/2024    CALCIUM 8.9 05/02/2024     05/02/2024    K 4.2 05/02/2024    CO2 25 05/02/2024     05/02/2024    BUN 27 (H) 05/02/2024    CREATININE 1.30 05/02/2024     Lab Results   Component Value Date    ALT <5 (L) 05/02/2024    AST 14 05/02/2024    ALKPHOS 80 05/02/2024    BILITOT 0.3 05/02/2024     Lab Results   Component Value Date    WBC 7.7 05/02/2024    HGB 14.4 05/02/2024    HCT 45.6 05/02/2024    MCV 91 05/02/2024     (L) 05/02/2024     Lab Results   Component Value Date    CHOL 193 05/02/2024    CHOL 121 (L) 09/28/2023    CHOL 125 (L) 08/20/2022     Lab Results   Component Value Date    HDL 31.0 (L) 05/02/2024    HDL 39 (L) 09/28/2023    HDL 35 (L) 08/20/2022     Lab Results   Component Value Date    LDLCALC  05/02/2024      Comment:      Unable to report calculated LDL due to elevated  Triglycerides. Please order a Direct LDL if required.    LDLCALC 50 (L) 09/28/2023    LDLCALC 63 (L) 08/20/2022     Lab Results   Component Value Date    TRIG 529 (H) 05/02/2024    TRIG 162 (H) 09/28/2023    TRIG 136 08/20/2022     No components found for: \"CHOLHDL\"  Lab Results   Component Value Date    HGBA1C 6.3 05/02/2024     Other labs not included in the list above were reviewed either before or during this encounter.    History    Past Medical " History:   Diagnosis Date    Abdominal aortic aneurysm (CMS-[facility]) 08/23/2023    AAA reapir S/P aorto fem bypass  10/18    Cigarette smoker 08/23/2023    Declines screenig >4 5/24 aware inc M/M    Colon cancer screening 05/02/2024    Declines colonoscopy, Cologuard, FIT, >4 x aware inc M/M.    Hyperglyceridemia     5/25 TG inc. recheck fasting 12 hrs, avoid alcohol. IF TG >500 on recheck then Omega3 acid ethyl esters 2 G BID will  be needed.    Screening for prostate cancer 05/02/2024 5/24 ordered    Stage 3 chronic kidney disease (Multi) 08/23/2023     Past Surgical History:   Procedure Laterality Date    CT ANGIO AORTA AND BILATERAL ILIOFEMORAL RUN OFF INCLUDING WITHOUT CONTRAST IF PERFORMED  9/10/2018    CT AORTA AND BILATERAL ILIOFEMORAL RUNOFF ANGIOGRAM W AND/OR WO IV CONTRAST [facility]    OTHER SURGICAL HISTORY  11/28/2018    Iliofemoral bypass    OTHER SURGICAL HISTORY  11/28/2018    Aortic aneurysm repair     Family History   Problem Relation Name Age of Onset    Hypertension Mother      Other (cardiac arrest) Father      Cancer Other maternal uncle      Social History     Socioeconomic History    Marital status:      Spouse name: Not on file    Number of children: Not on file    Years of education: Not on file    Highest education level: Not on file   Occupational History    Not on file   Tobacco Use    Smoking status: Every Day     Current packs/day: 1.50     Average packs/day: 1.5 packs/day for 55.0 years (82.5 ttl pk-yrs)     Types: Cigarettes     Start date: 1970     Passive exposure: Current    Smokeless tobacco: Current   Vaping Use    Vaping status: Never Used   Substance and Sexual Activity    Alcohol use: Yes    Drug use: Yes     Types: Marijuana    Sexual activity: Not on file   Other Topics Concern    Not on file   Social History Narrative    Not on file     Social Drivers of Health     Financial Resource Strain: Not on file   Food Insecurity: Not on file    Transportation Needs: Not on file   Physical Activity: Not on file   Stress: Not on file   Social Connections: Not on file   Intimate Partner Violence: Not on file   Housing Stability: Not on file     No Known Allergies  Current Outpatient Medications on File Prior to Visit   Medication Sig Dispense Refill    acetaminophen/diphenhydramine (TYLENOL COLD RELIEF ORAL) Take by mouth.      allopurinol (Zyloprim) 100 mg tablet Take 1 tablet (100 mg) by mouth once daily. 90 tablet 2    atorvastatin (Lipitor) 40 mg tablet Take 1 tablet (40 mg) by mouth once daily. 90 tablet 2    empagliflozin (Jardiance) 25 mg Take 1 tablet (25 mg) by mouth once daily in the morning. 30 tablet 5    hydroCHLOROthiazide (HYDRODiuril) 25 mg tablet Take 1 tablet (25 mg) by mouth once daily. 90 tablet 2    losartan (Cozaar) 50 mg tablet Take 1 tablet (50 mg) by mouth once daily. 90 tablet 2    metFORMIN  mg 24 hr tablet Take 2 tablets (1,000 mg) by mouth once daily. With a meal as directed 180 tablet 2    omeprazole (PriLOSEC) 20 mg DR capsule Take 1 capsule (20 mg) by mouth once daily in the morning. Take before meals. 30 mins before meal 90 capsule 2    verapamil SR (Calan-SR) 240 mg ER tablet Take 1 tablet (240 mg) by mouth once daily. 90 tablet 2     No current facility-administered medications on file prior to visit.     Immunization History   Administered Date(s) Administered    Influenza, injectable, quadrivalent 11/02/2016, 11/08/2017, 12/04/2018, 10/03/2019    PPD Test 10/31/2018, 11/10/2018    Pneumococcal conjugate vaccine, 13-valent (PREVNAR 13) 11/02/2016    Pneumococcal polysaccharide vaccine, 23-valent, age 2 years and older (PNEUMOVAX 23) 03/08/2022    Td vaccine, age 7 years and older (TDVAX) 01/01/2001    Tdap vaccine, age 7 year and older (BOOSTRIX, ADACEL) 06/07/2021     Patient's medical history was reviewed and updated either before or during this encounter.       Michelle Leahy MD

## 2025-01-17 ENCOUNTER — APPOINTMENT (OUTPATIENT)
Dept: RADIOLOGY | Facility: HOSPITAL | Age: 70
End: 2025-01-17
Payer: MEDICARE

## 2025-01-17 ENCOUNTER — HOSPITAL ENCOUNTER (OUTPATIENT)
Dept: RADIOLOGY | Facility: HOSPITAL | Age: 70
Discharge: HOME | End: 2025-01-17
Payer: MEDICARE

## 2025-01-17 DIAGNOSIS — Z98.890 S/P AAA REPAIR: ICD-10-CM

## 2025-01-17 DIAGNOSIS — Z86.79 S/P AAA REPAIR: ICD-10-CM

## 2025-01-17 PROCEDURE — 76775 US EXAM ABDO BACK WALL LIM: CPT | Performed by: RADIOLOGY

## 2025-01-17 PROCEDURE — 76706 US ABDL AORTA SCREEN AAA: CPT

## 2025-01-31 ENCOUNTER — APPOINTMENT (OUTPATIENT)
Dept: RADIOLOGY | Facility: HOSPITAL | Age: 70
End: 2025-01-31
Payer: MEDICARE

## 2025-07-08 ENCOUNTER — OFFICE VISIT (OUTPATIENT)
Dept: PRIMARY CARE | Facility: CLINIC | Age: 70
End: 2025-07-08
Payer: MEDICARE

## 2025-07-08 VITALS
DIASTOLIC BLOOD PRESSURE: 80 MMHG | TEMPERATURE: 97.8 F | HEIGHT: 71 IN | OXYGEN SATURATION: 97 % | WEIGHT: 184 LBS | SYSTOLIC BLOOD PRESSURE: 138 MMHG | BODY MASS INDEX: 25.76 KG/M2 | HEART RATE: 56 BPM

## 2025-07-08 DIAGNOSIS — E55.9 VITAMIN D DEFICIENCY: ICD-10-CM

## 2025-07-08 DIAGNOSIS — E78.2 MIXED HYPERLIPIDEMIA: ICD-10-CM

## 2025-07-08 DIAGNOSIS — N18.31 STAGE 3A CHRONIC KIDNEY DISEASE (MULTI): ICD-10-CM

## 2025-07-08 DIAGNOSIS — F17.218 CIGARETTE NICOTINE DEPENDENCE WITH OTHER NICOTINE-INDUCED DISORDER: ICD-10-CM

## 2025-07-08 DIAGNOSIS — R94.31 ABNORMAL ECG: ICD-10-CM

## 2025-07-08 DIAGNOSIS — Z00.00 ENCOUNTER FOR MEDICARE ANNUAL WELLNESS EXAM: Primary | ICD-10-CM

## 2025-07-08 DIAGNOSIS — K21.9 GASTROESOPHAGEAL REFLUX DISEASE WITHOUT ESOPHAGITIS: ICD-10-CM

## 2025-07-08 DIAGNOSIS — Z12.11 COLON CANCER SCREENING: ICD-10-CM

## 2025-07-08 DIAGNOSIS — I12.9 BENIGN HYPERTENSIVE KIDNEY DISEASE WITH CHRONIC KIDNEY DISEASE STAGE I THROUGH STAGE IV, OR UNSPECIFIED(403.10): ICD-10-CM

## 2025-07-08 DIAGNOSIS — Z12.5 ENCOUNTER FOR PROSTATE CANCER SCREENING: ICD-10-CM

## 2025-07-08 DIAGNOSIS — M1A.9XX0 CHRONIC GOUT WITHOUT TOPHUS, UNSPECIFIED CAUSE, UNSPECIFIED SITE: ICD-10-CM

## 2025-07-08 DIAGNOSIS — E11.9 TYPE 2 DIABETES MELLITUS WITHOUT COMPLICATION, WITHOUT LONG-TERM CURRENT USE OF INSULIN: ICD-10-CM

## 2025-07-08 DIAGNOSIS — D69.3 CHRONIC IDIOPATHIC THROMBOCYTOPENIA (MULTI): ICD-10-CM

## 2025-07-08 LAB
25(OH)D3+25(OH)D2 SERPL-MCNC: 26 NG/ML (ref 30–100)
ALBUMIN SERPL-MCNC: 4.5 G/DL (ref 3.6–5.1)
ALBUMIN/CREAT UR: 9 MG/G CREAT
ALP SERPL-CCNC: 61 U/L (ref 35–144)
ALT SERPL-CCNC: 22 U/L (ref 9–46)
ANION GAP SERPL CALCULATED.4IONS-SCNC: 11 MMOL/L (CALC) (ref 7–17)
AST SERPL-CCNC: 18 U/L (ref 10–35)
BASOPHILS # BLD AUTO: 69 CELLS/UL (ref 0–200)
BASOPHILS NFR BLD AUTO: 1 %
BILIRUB SERPL-MCNC: 0.7 MG/DL (ref 0.2–1.2)
BUN SERPL-MCNC: 26 MG/DL (ref 7–25)
CALCIUM SERPL-MCNC: 9.5 MG/DL (ref 8.6–10.3)
CHLORIDE SERPL-SCNC: 107 MMOL/L (ref 98–110)
CHOLEST SERPL-MCNC: 164 MG/DL
CHOLEST/HDLC SERPL: 4.3 (CALC)
CO2 SERPL-SCNC: 24 MMOL/L (ref 20–32)
CREAT SERPL-MCNC: 1.42 MG/DL (ref 0.7–1.35)
CREAT UR-MCNC: 182 MG/DL (ref 20–320)
EGFRCR SERPLBLD CKD-EPI 2021: 54 ML/MIN/1.73M2
EOSINOPHIL # BLD AUTO: 269 CELLS/UL (ref 15–500)
EOSINOPHIL NFR BLD AUTO: 3.9 %
ERYTHROCYTE [DISTWIDTH] IN BLOOD BY AUTOMATED COUNT: 13.9 % (ref 11–15)
EST. AVERAGE GLUCOSE BLD GHB EST-MCNC: 134 MG/DL
EST. AVERAGE GLUCOSE BLD GHB EST-SCNC: 7.4 MMOL/L
GLUCOSE SERPL-MCNC: 104 MG/DL (ref 65–99)
HBA1C MFR BLD: 6.3 %
HCT VFR BLD AUTO: 48.1 % (ref 38.5–50)
HDLC SERPL-MCNC: 38 MG/DL
HGB BLD-MCNC: 15.4 G/DL (ref 13.2–17.1)
LDLC SERPL CALC-MCNC: 105 MG/DL (CALC)
LYMPHOCYTES # BLD AUTO: 1380 CELLS/UL (ref 850–3900)
LYMPHOCYTES NFR BLD AUTO: 20 %
MCH RBC QN AUTO: 28.5 PG (ref 27–33)
MCHC RBC AUTO-ENTMCNC: 32 G/DL (ref 32–36)
MCV RBC AUTO: 89.1 FL (ref 80–100)
MICROALBUMIN UR-MCNC: 1.6 MG/DL
MONOCYTES # BLD AUTO: 504 CELLS/UL (ref 200–950)
MONOCYTES NFR BLD AUTO: 7.3 %
NEUTROPHILS # BLD AUTO: 4678 CELLS/UL (ref 1500–7800)
NEUTROPHILS NFR BLD AUTO: 67.8 %
NONHDLC SERPL-MCNC: 126 MG/DL (CALC)
PLATELET # BLD AUTO: 137 THOUSAND/UL (ref 140–400)
PMV BLD REES-ECKER: 12.5 FL (ref 7.5–12.5)
POTASSIUM SERPL-SCNC: 4.2 MMOL/L (ref 3.5–5.3)
PROT SERPL-MCNC: 7.2 G/DL (ref 6.1–8.1)
PSA SERPL-MCNC: 0.88 NG/ML
RBC # BLD AUTO: 5.4 MILLION/UL (ref 4.2–5.8)
SODIUM SERPL-SCNC: 142 MMOL/L (ref 135–146)
TRIGL SERPL-MCNC: 116 MG/DL
TSH SERPL-ACNC: 0.97 MIU/L (ref 0.4–4.5)
WBC # BLD AUTO: 6.9 THOUSAND/UL (ref 3.8–10.8)

## 2025-07-08 PROCEDURE — 99214 OFFICE O/P EST MOD 30 MIN: CPT | Mod: 25 | Performed by: INTERNAL MEDICINE

## 2025-07-08 PROCEDURE — 93005 ELECTROCARDIOGRAM TRACING: CPT | Performed by: INTERNAL MEDICINE

## 2025-07-08 PROCEDURE — 99215 OFFICE O/P EST HI 40 MIN: CPT | Mod: 25 | Performed by: INTERNAL MEDICINE

## 2025-07-08 RX ORDER — LOSARTAN POTASSIUM 50 MG/1
50 TABLET ORAL DAILY
Qty: 90 TABLET | Refills: 2 | Status: SHIPPED | OUTPATIENT
Start: 2025-07-08

## 2025-07-08 RX ORDER — ATORVASTATIN CALCIUM 40 MG/1
40 TABLET, FILM COATED ORAL DAILY
Qty: 90 TABLET | Refills: 2 | Status: SHIPPED | OUTPATIENT
Start: 2025-07-08

## 2025-07-08 RX ORDER — VERAPAMIL HCL 240 MG
240 TABLET, EXTENDED RELEASE ORAL DAILY
Qty: 90 TABLET | Refills: 2 | Status: SHIPPED | OUTPATIENT
Start: 2025-07-08

## 2025-07-08 RX ORDER — OMEPRAZOLE 20 MG/1
20 CAPSULE, DELAYED RELEASE ORAL
Qty: 90 CAPSULE | Refills: 2 | Status: SHIPPED | OUTPATIENT
Start: 2025-07-08

## 2025-07-08 RX ORDER — ALLOPURINOL 100 MG/1
100 TABLET ORAL DAILY
Qty: 90 TABLET | Refills: 2 | Status: SHIPPED | OUTPATIENT
Start: 2025-07-08

## 2025-07-08 RX ORDER — HYDROCHLOROTHIAZIDE 25 MG/1
25 TABLET ORAL DAILY
Qty: 90 TABLET | Refills: 2 | Status: SHIPPED | OUTPATIENT
Start: 2025-07-08

## 2025-07-08 RX ORDER — METFORMIN HYDROCHLORIDE 500 MG/1
1000 TABLET, EXTENDED RELEASE ORAL DAILY
Qty: 180 TABLET | Refills: 2 | Status: SHIPPED | OUTPATIENT
Start: 2025-07-08

## 2025-07-08 ASSESSMENT — ENCOUNTER SYMPTOMS
OCCASIONAL FEELINGS OF UNSTEADINESS: 0
DEPRESSION: 0
LOSS OF SENSATION IN FEET: 0

## 2025-07-08 ASSESSMENT — PAIN SCALES - GENERAL: PAINLEVEL_OUTOF10: 3

## 2025-07-08 NOTE — PROGRESS NOTES
[facility]: [facility]  MEDICARE WELLNESS EXAM      Darrion Pruett is a 70 y.o. male that is presenting today for Annual Exam (Left shoulder pain since fall in Dec. ).    Assessment/Plan    Diagnoses and all orders for this visit:  Encounter for Medicare annual wellness exam      Stable overall, Discussed BW and /or DI results and answered all questions Updated HM - Vacc   Stage 3a chronic kidney disease (Multi)      - Stable / Continue to monitor    - Stressed importance of hydration and avoiding Nephrotoxic meds mostly OTC NSAIDs  -     Albumin-Creatinine Ratio, Urine Random; Future  Chronic idiopathic thrombocytopenia (Multi)         No signs of abnormal bruising or active bleeding          -     CBC and Auto Differential; Future  Type 2 diabetes mellitus without complication, without long-term current use of insulin      Per most recent BW well controlled  Continue current medication   Rx Escripted 90 days x 2  -     metFORMIN  mg 24 hr tablet; Take 2 tablets (1,000 mg) by mouth once daily. With a meal as directed  -     empagliflozin (Jardiance) 25 mg tablet; Take 1 tablet (25 mg) by mouth once daily in the morning.  -     Hemoglobin A1C; Future  -     TSH with reflex to Free T4 if abnormal; Future  -     Comprehensive Metabolic Panel; Future  -     Albumin-Creatinine Ratio, Urine Random; Future  -     ECG 12 lead (Clinic Performed)  Benign hypertensive kidney disease with chronic kidney disease stage I through stage IV, or unspecified(403.10)      Per above   -     metFORMIN  mg 24 hr tablet; Take 2 tablets (1,000 mg) by mouth once daily. With a meal as directed  -     verapamil SR (Calan-SR) 240 mg ER tablet; Take 1 tablet (240 mg) by mouth once daily.  -     hydroCHLOROthiazide (HYDRODiuril) 25 mg tablet; Take 1 tablet (25 mg) by mouth once daily.  -     losartan (Cozaar) 50 mg tablet; Take 1 tablet (50 mg) by mouth once daily.  -     ECG 12 lead (Clinic  Performed)  Chronic gout without tophus, unspecified cause, unspecified site     Under control with current treatment   Continue the same   Rx E-scripted 90 days x 2  -     allopurinol (Zyloprim) 100 mg tablet; Take 1 tablet (100 mg) by mouth once daily.  -       Uric Acid   Gastroesophageal reflux disease without esophagitis      Under control with current treatment   Continue the same   Rx E-scripted 90 days x 2  -     omeprazole (PriLOSEC) 20 mg DR capsule; Take 1 capsule (20 mg) by mouth once daily in the morning. Take before meals. 30 mins before meal  Abnormal ECG     Discussed cardiology referral / patient wants to wait ( asymptomatic)  Mixed hyperlipidemia   Per most recent BW well controlled -->   Continue current medication   Rx Escripted 90 days x 2  -     atorvastatin (Lipitor) 40 mg tablet; Take 1 tablet (40 mg) by mouth once daily.  -     Comprehensive Metabolic Panel; Future  -     Lipid Panel; Future  -     ECG 12 lead (Clinic Performed)  Cigarette nicotine dependence with other nicotine-induced disorder  -     CT lung screening low dose; Future  Colon cancer screening  -     Referral to Gastroenterology; Future  Vitamin D deficiency  -     Vitamin D 25-Hydroxy,Total (for eval of Vitamin D levels); Future  Encounter for prostate cancer screening  -     Prostate Specific Antigen; Future  Other orders  -     Follow Up In Primary Care  -     Follow Up In Primary Care; Future  ADVANCED CARE PLANNING  Advanced Care Planning was discussed with patient:  The patient does not have an advanced care plan on file. The patient does not have an active surrogate decision-maker on file.  Encouraged the patient to confirm that Living Will and Healthcare Power of  (HCPoA) are accurate and up to date.  Encouraged the patient to confirm that our office be provided a copy of any documentation in the event that anything changes.    ACTIVITIES OF DAILY LIVING  Basic ADLs:  Bathing: Independent, Dressing:  Independent, Toileting: Independent, Transferring: Independent, Continence: Independent, Feeding: Independent.    Instrumental ADLs:  Ability to use phone: Independent, Shopping: Independent, Cooking: Independent, House-keeping: Independent, Laundry: Independent, Transportation: Independent, Medication Management: Requires Assistance, Finance Management: Independent.    Subjective   HPI    - Darrion Pruett 70 y.o. male is here today for his AWV/ BW / refills    Been out of his meds for the past 2 weeks   Did not call bcz he had this appt ??!!       - Patient denies any other  symptoms or concerns at this time.       - patient denies any adverse reactions to or concerns with his/her meds.       - Problem list and medication reconciliation done today.  - V.S. Stable. No changes at this time.  - Encouraged continued diet and exercise modification.     Review of Systems  All pertinent POSITIVES as noted per HPI.  All other systems have been reviewed and are NEGATIVE and /or Noncontributory to this patient current visit or complaint.    Objective   Vitals:    07/08/25 1042   BP: 138/80   Pulse: 56   Temp: 36.6 °C (97.8 °F)   SpO2: 97%      Body mass index is 25.66 kg/m².  Physical Exam  Vitals and nursing note reviewed.   Constitutional:       Appearance: Normal appearance.   HENT:      Head: Normocephalic and atraumatic.      Right Ear: Tympanic membrane, ear canal and external ear normal.      Left Ear: Tympanic membrane, ear canal and external ear normal.      Nose: Nose normal.      Mouth/Throat:      Mouth: Mucous membranes are moist.      Pharynx: Oropharynx is clear.   Eyes:      Extraocular Movements: Extraocular movements intact.      Conjunctiva/sclera: Conjunctivae normal.      Pupils: Pupils are equal, round, and reactive to light.   Neck:      Vascular: No carotid bruit.   Cardiovascular:      Rate and Rhythm: Normal rate and regular rhythm.      Pulses: Normal pulses.      Heart sounds: Normal heart sounds.    Pulmonary:      Effort: Pulmonary effort is normal.      Breath sounds: Normal breath sounds.   Abdominal:      General: Abdomen is flat. Bowel sounds are normal.      Palpations: Abdomen is soft.   Musculoskeletal:         General: No swelling. Normal range of motion.      Cervical back: Normal range of motion and neck supple.   Feet:      Right foot:      Skin integrity: Skin integrity normal.      Toenail Condition: Right toenails are normal.      Left foot:      Skin integrity: Skin integrity normal.      Toenail Condition: Left toenails are normal.      Comments:  Bilateral feet with :   Normal - Pinprick sensation   Normal - Vibratory sensation    Normal - M-S   Normal - PT/DP    Present - Achilles DTRs   Lymphadenopathy:      Cervical: No cervical adenopathy.   Skin:     General: Skin is warm and dry.   Neurological:      General: No focal deficit present.      Mental Status: He is alert and oriented to person, place, and time. Mental status is at baseline.   Psychiatric:         Mood and Affect: Mood normal.         Behavior: Behavior normal.       Diagnostic Results   Lab Results   Component Value Date    GLUCOSE 104 (H) 07/07/2025    CALCIUM 9.5 07/07/2025     07/07/2025    K 4.2 07/07/2025    CO2 24 07/07/2025     07/07/2025    BUN 26 (H) 07/07/2025    CREATININE 1.42 (H) 07/07/2025     Lab Results   Component Value Date    ALT 22 07/07/2025    AST 18 07/07/2025    ALKPHOS 61 07/07/2025    BILITOT 0.7 07/07/2025     Lab Results   Component Value Date    WBC 6.9 07/07/2025    HGB 15.4 07/07/2025    HCT 48.1 07/07/2025    MCV 89.1 07/07/2025     (L) 07/07/2025     Lab Results   Component Value Date    CHOL 164 07/07/2025    CHOL 193 05/02/2024    CHOL 121 (L) 09/28/2023     Lab Results   Component Value Date    HDL 38 (L) 07/07/2025    HDL 31.0 (L) 05/02/2024    HDL 39 (L) 09/28/2023     Lab Results   Component Value Date    LDLCALC 105 (H) 07/07/2025    LDLCALC  05/02/2024       "Comment:      Unable to report calculated LDL due to elevated  Triglycerides. Please order a Direct LDL if required.    LDLCALC 50 (L) 09/28/2023     Lab Results   Component Value Date    TRIG 116 07/07/2025    TRIG 529 (H) 05/02/2024    TRIG 162 (H) 09/28/2023     No components found for: \"CHOLHDL\"  Lab Results   Component Value Date    HGBA1C 6.3 (H) 07/07/2025     Other labs not included in the list above reviewed either before or during this encounter.    History   Medical History[1]  Surgical History[2]  Family History[3]  Social History     Socioeconomic History    Marital status:      Spouse name: Not on file    Number of children: Not on file    Years of education: Not on file    Highest education level: Not on file   Occupational History    Not on file   Tobacco Use    Smoking status: Every Day     Current packs/day: 1.50     Average packs/day: 1.5 packs/day for 55.5 years (83.3 ttl pk-yrs)     Types: Cigarettes     Start date: 1970     Passive exposure: Current    Smokeless tobacco: Current     Types: Chew   Vaping Use    Vaping status: Never Used   Substance and Sexual Activity    Alcohol use: Yes    Drug use: Yes     Types: Marijuana    Sexual activity: Not on file   Other Topics Concern    Not on file   Social History Narrative    Not on file     Social Drivers of Health     Financial Resource Strain: Not on file   Food Insecurity: Not on file   Transportation Needs: Not on file   Physical Activity: Not on file   Stress: Not on file   Social Connections: Not on file   Intimate Partner Violence: Not on file   Housing Stability: Not on file     Allergies[4]  Medications Ordered Prior to Encounter[5]  Immunization History   Administered Date(s) Administered    Influenza, injectable, quadrivalent 11/02/2016, 11/08/2017, 12/04/2018, 10/03/2019    PPD Test 10/31/2018, 11/10/2018    Pneumococcal conjugate vaccine, 13-valent (PREVNAR 13) 11/02/2016    Pneumococcal polysaccharide vaccine, 23-valent, age 2 " years and older (PNEUMOVAX 23) 03/08/2022    Td vaccine, age 7 years and older (TDVAX) 01/01/2001    Tdap vaccine, age 7 year and older (BOOSTRIX, ADACEL) 06/07/2021     Patient's medical history was reviewed and updated either before or during this encounter.     Michelle Leahy MD         [1]   Past Medical History:  Diagnosis Date    Abdominal aortic aneurysm 08/23/2023    AAA reapir S/P aorto fem bypass  10/18    Cigarette smoker 08/23/2023    Declines screenig >4 5/24 aware inc M/M    Colon cancer screening 05/02/2024    Declines colonoscopy, Cologuard, FIT, >4 x aware inc M/M.    Hyperglyceridemia     5/25 TG inc. recheck fasting 12 hrs, avoid alcohol. IF TG >500 on recheck then Omega3 acid ethyl esters 2 G BID will  be needed.    Screening for prostate cancer 05/02/2024 5/24 ordered    Stage 3 chronic kidney disease (Multi) 08/23/2023   [2]   Past Surgical History:  Procedure Laterality Date    CT ANGIO AORTA AND BILATERAL ILIOFEMORAL RUN OFF INCLUDING WITHOUT CONTRAST IF PERFORMED  9/10/2018    CT AORTA AND BILATERAL ILIOFEMORAL RUNOFF ANGIOGRAM W AND/OR WO IV CONTRAST [facility]    OTHER SURGICAL HISTORY  11/28/2018    Iliofemoral bypass    OTHER SURGICAL HISTORY  11/28/2018    Aortic aneurysm repair   [3]   Family History  Problem Relation Name Age of Onset    Hypertension Mother      Other (cardiac arrest) Father      Cancer Other maternal uncle    [4] No Known Allergies  [5]   Current Outpatient Medications on File Prior to Visit   Medication Sig Dispense Refill    [DISCONTINUED] allopurinol (Zyloprim) 100 mg tablet Take 1 tablet (100 mg) by mouth once daily. 90 tablet 2    [DISCONTINUED] atorvastatin (Lipitor) 40 mg tablet Take 1 tablet (40 mg) by mouth once daily. 90 tablet 2    [DISCONTINUED] empagliflozin (Jardiance) 25 mg Take 1 tablet (25 mg) by mouth once daily in the morning. 90 tablet 2    [DISCONTINUED] hydroCHLOROthiazide (HYDRODiuril) 25 mg tablet Take 1 tablet (25  mg) by mouth once daily. 90 tablet 2    [DISCONTINUED] losartan (Cozaar) 50 mg tablet Take 1 tablet (50 mg) by mouth once daily. 90 tablet 2    [DISCONTINUED] metFORMIN  mg 24 hr tablet Take 2 tablets (1,000 mg) by mouth once daily. With a meal as directed 180 tablet 2    [DISCONTINUED] omeprazole (PriLOSEC) 20 mg DR capsule Take 1 capsule (20 mg) by mouth once daily in the morning. Take before meals. 30 mins before meal 90 capsule 2    [DISCONTINUED] verapamil SR (Calan-SR) 240 mg ER tablet Take 1 tablet (240 mg) by mouth once daily. 90 tablet 2    [DISCONTINUED] acetaminophen/diphenhydramine (TYLENOL COLD RELIEF ORAL) Take by mouth.       No current facility-administered medications on file prior to visit.

## 2025-07-14 ENCOUNTER — TELEPHONE (OUTPATIENT)
Dept: PRIMARY CARE | Facility: CLINIC | Age: 70
End: 2025-07-14

## 2025-07-22 ENCOUNTER — HOSPITAL ENCOUNTER (OUTPATIENT)
Dept: RADIOLOGY | Facility: CLINIC | Age: 70
End: 2025-07-22
Payer: MEDICARE

## 2025-08-03 DIAGNOSIS — F17.218 CIGARETTE NICOTINE DEPENDENCE WITH OTHER NICOTINE-INDUCED DISORDER: Primary | ICD-10-CM
